# Patient Record
Sex: FEMALE | Race: WHITE | NOT HISPANIC OR LATINO | Employment: FULL TIME | URBAN - METROPOLITAN AREA
[De-identification: names, ages, dates, MRNs, and addresses within clinical notes are randomized per-mention and may not be internally consistent; named-entity substitution may affect disease eponyms.]

---

## 2024-06-12 ENCOUNTER — TELEPHONE (OUTPATIENT)
Dept: BARIATRICS/WEIGHT MGMT | Facility: CLINIC | Age: 39
End: 2024-06-12
Payer: COMMERCIAL

## 2024-06-12 NOTE — TELEPHONE ENCOUNTER
Called Viv Ladd    to discuss bariatric new patient packet   0 Months of SWL require by insurance       Current Health Plan(s)   Yes Cert of Coverage Obtained    VIA  OFFICE    Primary/ ID #  REJI KIRK  [id number]            Secondary/ ID #             Exclusion on Policy     Exclusion   No Pt wanting selfpay No     Deductible Individual                  Amount Met  1500.00                                        279.98 OOP Max Individual           Amount Met  3000.00                               307.54     Deductible Family                       Amount Met   OOP Max Family               Amount Met       Bariatric Lifetime Max NONE      Facility or Accreditation required?    Insurance Coverage % 80/20       Called Patient 6.12.24    Intake Scheduled for 7.16.24       Arrive at 745AM, bring drink, snack, and jacket, classroom is cold.  All pt will have class, [facility]      Cert of Coverage attached Yes         Told pt to expect call from [employer], area code [phone] about 2 weeks prior to intake.   Paperwork needs to be completed before intake appt, or may cause delay in eval. Yes

## 2024-07-16 ENCOUNTER — CONSULT (OUTPATIENT)
Dept: BARIATRICS/WEIGHT MGMT | Facility: CLINIC | Age: 39
End: 2024-07-16
Payer: COMMERCIAL

## 2024-07-16 VITALS
BODY MASS INDEX: 41.54 KG/M2 | RESPIRATION RATE: 18 BRPM | HEIGHT: 68 IN | HEART RATE: 101 BPM | DIASTOLIC BLOOD PRESSURE: 83 MMHG | SYSTOLIC BLOOD PRESSURE: 130 MMHG | OXYGEN SATURATION: 97 % | WEIGHT: 274.1 LBS

## 2024-07-16 DIAGNOSIS — Z01.818 PRE-OP EXAM: ICD-10-CM

## 2024-07-16 DIAGNOSIS — E66.01 OBESITY, CLASS III, BMI 40-49.9 (MORBID OBESITY): Primary | ICD-10-CM

## 2024-07-16 PROCEDURE — 99205 OFFICE O/P NEW HI 60 MIN: CPT | Performed by: NURSE PRACTITIONER

## 2024-07-16 RX ORDER — ESCITALOPRAM OXALATE 20 MG/1
1 TABLET ORAL DAILY
COMMUNITY
Start: 2024-05-05

## 2024-07-16 RX ORDER — SUMATRIPTAN 50 MG/1
50 TABLET, FILM COATED ORAL
COMMUNITY

## 2024-07-16 RX ORDER — DESOGESTREL AND ETHINYL ESTRADIOL 0.15-0.03
1 KIT ORAL DAILY
COMMUNITY
Start: 2024-07-01

## 2024-07-16 NOTE — PROGRESS NOTES
"Nutrition Services    Patient Name: Viv Ladd  YOB: 1985  MRN: [id number]  Date of Service: 07/16/24      ICD-10-CM ICD-9-CM   1. Obesity, Class III, BMI 40-49.9 (morbid obesity)  E66.01 278.01        RD Recommendation        Candidacy for surgery? Good   RD Comments Recommend patient for surgery   Procedure Patient is pursuing MWM vs surgery     NUTRITION ASSESSMENT - BARIATRIC SURGERY      Reason for Visit RDN eval for surgery, SWL 1/0     H&P      History reviewed. No pertinent past medical history.    Past Surgical History:   Procedure Laterality Date    BREAST AUGMENTATION          Previous Goals          Encounter Information        Visit Narrative     Patient considering MWM program over surgery.     Diet Recall:   Breakfast: skips, coffee  Lunch: sandwich with veggies and yogurt  Dinner: trying not to eat out currently so she has been cooking at home - frozen veggies   Snacks: chips, candy  Beverages: water, diet soda at night (2/day)    Exercise: no current exercise. 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       Nutrition Diagnosis         Nutrition Dx Statement Overweight/obesity RT multifactorial biochemical, behavioral and environmental contributors to disease AEB BMI 41.37 kg/m^2         Nutrition Intervention         Nutrition Intervention Nutrition education related to diet modification and physical activity        Monitor/Evaluation        New Goals Patient to add in MV  Patient to plan meals with protein, carb and color  Patient to add in 1-2 days of exercise for 10-15 minutes        Total time spent with pt 15 minutes of which 15 minutes were spent on education. "       Electronically signed by:  Edgardo Castillo RD  07/16/24 10:20 EDT
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
## 2024-08-01 ENCOUNTER — OFFICE VISIT (OUTPATIENT)
Dept: BARIATRICS/WEIGHT MGMT | Facility: CLINIC | Age: 39
End: 2024-08-01
Payer: COMMERCIAL

## 2024-08-01 VITALS
WEIGHT: 274.4 LBS | DIASTOLIC BLOOD PRESSURE: 80 MMHG | RESPIRATION RATE: 18 BRPM | BODY MASS INDEX: 41.59 KG/M2 | SYSTOLIC BLOOD PRESSURE: 121 MMHG | HEIGHT: 68 IN | HEART RATE: 87 BPM | OXYGEN SATURATION: 97 %

## 2024-08-01 DIAGNOSIS — E66.01 OBESITY, CLASS III, BMI 40-49.9 (MORBID OBESITY): Primary | ICD-10-CM

## 2024-08-01 NOTE — PROGRESS NOTES
[facility]  [address] [place] [address]  [address] [place] [address]  Dept: [phone]  8/1/2024      Viv Ladd.  [id number]  [id number]  1985  female      Chief Complaint of weight gain; unable to maintain weight loss    History of Present Illness:   Viv is a 39 y.o. female who presents today for evaluation, education and consultation regarding medical weight management.  She has a long history of being overweight. She lost weight in her early 20s. Over the last 10 years she started gaining about 10 lbs a year.     She does not exercise now.     Diet History: See dietician/RN/MA documentation for complete history of weight and diet.          Past weight loss attempts:       Past Medical History:   Diagnosis Date   • Anxiety    • Depression    • GERD (gastroesophageal reflux disease)    • Insomnia    • Plantar fasciitis    • SOB (shortness of breath) on exertion    • Urinary frequency        Past Surgical History:   Procedure Laterality Date   • BREAST AUGMENTATION     • BREAST SURGERY  December 2010    Breast lift       Allergies   Allergen Reactions   • Latex Hives         Current Outpatient Medications:   •  Apri 0.15-30 MG-MCG per tablet, Take 1 tablet by mouth Daily., Disp: , Rfl:   •  escitalopram (LEXAPRO) 20 MG tablet, Take 1 tablet by mouth Daily., Disp: , Rfl:   •  SUMAtriptan (IMITREX) 50 MG tablet, Take 1 tablet by mouth Every 2 (Two) Hours As Needed for Migraine. Take one tablet at onset of headache. May repeat dose one time in 2 hours if headache not relieved., Disp: , Rfl:     Social History     Socioeconomic History   • Marital status: Unknown   Tobacco Use   • Smoking status: Never   Substance and Sexual Activity   • Alcohol use: Not Currently   • Drug use: Never   • Sexual activity: Not Currently     Birth control/protection: Birth control pill       Family History   Problem Relation Age of  "Onset   • Cancer Mother         Skin cancer   • Hyperlipidemia Father    • Sleep apnea Father    • Alcohol abuse Father    • Hypertension Maternal Grandmother    • Diabetes Maternal Grandmother    • Stroke Maternal Grandmother    • Cancer Maternal Grandmother         Skin cancer   • COPD Maternal Grandfather         Passed 2018   • Heart disease Maternal Grandfather    • Stroke Maternal Grandfather    • Hypertension Paternal Grandfather    • Heart attack Paternal Grandfather    • Alcohol abuse Paternal Grandfather         Most of the Wilberto side are \"functioning\" alcoholics   • Alcohol abuse Sister    • Cancer Sister         Skin cancer         Review of Systems:  Review of Systems    Physical Exam:  Vital Signs:  Weight: 124 kg (274 lb 6.4 oz)   Body mass index is 267.21 kg/m².      Heart Rate: 87   BP: 121/80     Physical Exam       Assessment:         Viv Ladd is a 39 y.o. year old female with medically complicated severe obesity. Weight: 124 kg (274 lb 6.4 oz), Body mass index is 267.21 kg/m². and weight related problems.    CBC, CMP,  TSH, lipid panel, Vitamin D and HgbA1C will be drawn.       The patient was advised to start a high protein, low fat and low carbohydrate diet. The patient was given individualized information  along with general group information and handouts.     The patient was encouraged to start routine exercise including but not limited to 150 minutes per week.     The consultation plan was reviewed with the patient.        I think she is a good candidate for medical weight management. If appropriate, weight loss medications will be discussed at next visit.     No diagnosis found.    Plan:    Patient will have evaluations and follow up with bariatric dietician before undergoing a multidisciplinary review of their candidacy.  We also discussed other program requirements.      Total time spent with pt 60 minutes of which 45 minutes were spent on education.     Gabriela Rodarte MD  8/1/2024 "

## 2024-08-01 NOTE — PROGRESS NOTES
"Nutrition Services    Patient Name: Viv Ladd  YOB: 1985  MRN: [id number]  Date of Service: 08/01/24      ICD-10-CM ICD-9-CM   1. Obesity, Class III, BMI 40-49.9 (morbid obesity)  E66.01 278.01          NUTRITION ASSESSMENT - BARIATRIC SURGERY      Reason for Visit MWM new patient, appt 1     H&P      Past Medical History:   Diagnosis Date    Anxiety     Depression     GERD (gastroesophageal reflux disease)     Insomnia     Plantar fasciitis     SOB (shortness of breath) on exertion     Urinary frequency        Past Surgical History:   Procedure Laterality Date    BREAST AUGMENTATION      BREAST SURGERY  December 2010    Breast lift        Previous Goals   Patient to add in MV - met  Patient to plan meals with protein, carb and color - working on  Patient to add in 1-2 days of exercise for 10-15 minutes - working on       Encounter Information        Visit Narrative     Patient was originally pursuing surgery but now in MWM program. Patient has been working on diet and doing well overall. Patient going to plan meals for start of school year. Patient has not yet added in exercise but has been active with school back in session.     Diet Recall:   Breakfast: coffee  Lunch:  Dinner:  Snacks:  Beverages:    Exercise: active at work    Supplements: MV    Self Monitoring:          Anthropometrics        Current Height, Weight Height: 172.7 cm (68\")  Weight: 124 kg (274 lb 6.4 oz) (08/01/24 1347)            Wt Readings from Last 30 Encounters:   08/01/24 1347 124 kg (274 lb 6.4 oz)   07/16/24 0753 124 kg (274 lb 1.6 oz)      BMI kg/m2 Body mass index is 41.72 kg/m².       Nutrition Diagnosis         Nutrition Dx Statement Overweight/obesity RT multifactorial biochemical, behavioral and environmental contributors to disease AEB BMI 41.72 kg/m^2         Nutrition Intervention         Nutrition Intervention Nutrition education related to diet modification and physical activity        Monitor/Evaluation  "       New Goals Patient to continue planning meals with protein, carb and color  Patient to add in exercise as able, aiming for 1-2 days each week for 15-30 minutes        Total time spent with pt 15 minutes of which 15 minutes were spent on education.       Electronically signed by:  Edgardo Castillo RD  08/01/24 14:43 EDT

## 2024-08-12 ENCOUNTER — TELEPHONE (OUTPATIENT)
Dept: BARIATRICS/WEIGHT MGMT | Facility: CLINIC | Age: 39
End: 2024-08-12
Payer: COMMERCIAL

## 2024-08-12 NOTE — TELEPHONE ENCOUNTER
Patient called states denied for Ozempic by Insurance. Wants to know what the alternative is....  Please advise

## 2024-08-13 ENCOUNTER — TELEPHONE (OUTPATIENT)
Dept: BARIATRICS/WEIGHT MGMT | Facility: CLINIC | Age: 39
End: 2024-08-13
Payer: COMMERCIAL

## 2024-08-13 NOTE — TELEPHONE ENCOUNTER
CALLER: NATHALIA ESPARZA    RELATIONSHIP: SELF    MNT, NON-SURGICAL, OR SURGICAL PATIENT: NON SURGIGAL    Surgery patients only:  ARE YOU WORKING TOWARDS SURGERY or HAVE YOU ALREADY HAD SURGERY:   WHAT SURGERY DID YOU HAVE:   WHEN WAS YOUR SURGERY:        WHO ARE YOU REQUESTING TO SPEAK WITH:     WHAT WAS THE CALL REGARDING: THE MEDICATION THAT WAS CALLED IN YESTERDAY WAS DENIED BY INSURANCE. THE MEDICATION WAS OZEMPIC. PLEASE CALL IN ANOTHER MEDICATION IF POSSIBLE     DO YOU REQUIRE A CALLBACK or [employer]HART MESSAGE: PLEASE RESPOND IN [employer] MESSAGE     BEST CALL BACK NUMBER:      Inform caller you will send a message to staff member they requested to speak with and should expect a return call within 48 hours. Unless call is high priority or urgent.

## 2024-08-14 NOTE — TELEPHONE ENCOUNTER
[facility], but cost will be about 250-300 a month. Are they ok with that?   Gabriela Rodarte

## 2024-08-29 ENCOUNTER — OFFICE VISIT (OUTPATIENT)
Dept: BARIATRICS/WEIGHT MGMT | Facility: CLINIC | Age: 39
End: 2024-08-29
Payer: COMMERCIAL

## 2024-08-29 VITALS
HEART RATE: 85 BPM | BODY MASS INDEX: 41.59 KG/M2 | HEIGHT: 68 IN | DIASTOLIC BLOOD PRESSURE: 77 MMHG | WEIGHT: 274.4 LBS | OXYGEN SATURATION: 98 % | SYSTOLIC BLOOD PRESSURE: 124 MMHG

## 2024-08-29 DIAGNOSIS — Z79.899 MEDICATION MANAGEMENT: ICD-10-CM

## 2024-08-29 DIAGNOSIS — E66.01 OBESITY, CLASS III, BMI 40-49.9 (MORBID OBESITY): Primary | ICD-10-CM

## 2024-08-29 RX ORDER — ONDANSETRON 8 MG/1
8 TABLET, FILM COATED ORAL EVERY 8 HOURS PRN
Qty: 30 TABLET | Refills: 2 | Status: SHIPPED | OUTPATIENT
Start: 2024-08-29

## 2024-08-29 RX ORDER — SEMAGLUTIDE 0.5 MG/.5ML
0.5 INJECTION, SOLUTION SUBCUTANEOUS WEEKLY
Qty: 2 ML | Refills: 0 | Status: SHIPPED | OUTPATIENT
Start: 2024-08-29

## 2024-08-29 NOTE — PROGRESS NOTES
"Nutrition Services    Patient Name: Viv Ladd  YOB: 1985  MRN: [id number]  Date of Service: 08/29/24      ICD-10-CM ICD-9-CM   1. Obesity, Class III, BMI 40-49.9 (morbid obesity)  E66.01 278.01          NUTRITION ASSESSMENT - BARIATRIC SURGERY      Reason for Visit MWM appt 2, follow up     H&P      Past Medical History:   Diagnosis Date    Anxiety     Depression     GERD (gastroesophageal reflux disease)     Insomnia     Plantar fasciitis     SOB (shortness of breath) on exertion     Urinary frequency        Past Surgical History:   Procedure Laterality Date    BREAST AUGMENTATION      BREAST SURGERY  December 2010    Breast lift        Previous Goals   Patient to continue planning meals with protein, carb and color  Patient to add in exercise as able, aiming for 1-2 days each week for 15-30 minutes       Encounter Information        Visit Narrative     Patient states she had been doing well with meal planning but then started eating out more often. 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 Patient reports she has been busier on weekends but has not added in exercise.     Diet Recall:   Breakfast: coffee, protein shake  Lunch: sandwich with pepper or carrots or fruit  Dinner: take out or chicken nuggets with veggies  Snacks: nuts, candy  Beverages: 80 oz of water    Exercise: patient has been more active on weekends    Supplements:    Self Monitoring:          Anthropometrics        Current Height, Weight Height: 173.4 cm (68.25\")  Weight: 124 kg (274 lb 6.4 oz) (08/29/24 0921)            Wt Readings from Last 30 Encounters:   08/29/24 0921 124 kg (274 lb 6.4 oz)   08/01/24 1347 124 kg (274 lb 6.4 oz)   07/16/24 0753 124 kg (274 lb 1.6 oz)      BMI kg/m2 Body mass index is 41.42 kg/m².       Nutrition Diagnosis         Nutrition Dx Statement Overweight/obesity RT multifactorial biochemical, behavioral and environmental contributors to disease " AEB BMI 41.42 kg/m^2         Nutrition Intervention         Nutrition Intervention Nutrition education related to diet modification and physical activity        Monitor/Evaluation        New Goals Patient to continue meal planning with protein, color and carbs  Patient to add in exercise on weekends for 15-30 minutes        Total time spent with pt 15 minutes of which 15 minutes were spent on education.       Electronically signed by:  Edgardo Castillo RD  08/29/24 09:47 EDT

## 2024-08-29 NOTE — PROGRESS NOTES
[facility] BAR SURG [facility] BARIATRIC SURGERY  [address] [place] [address]  [address] [place] [address]  Dept: [phone]  8/29/2024      Viv Ladd.  [id number]  [id number]  1985  female      Chief Complaint   Patient presents with    Follow-up     MWM #2       The patient is here for month 3of medical weight management. She had a loss of 0 lbs. The patient states that they are following the recommendations given by our office and dietician including a high lean protein, low carb and low fat diet. We recommended adequate fruits and vegetable intake along with limited portion sizes. Patient is working on eliminating fast foods, fried foods, sweets and soda. Viv Ladd has been increasing her daily water intake. She has been exercising: walking some at work.  Wt Readings from Last 10 Encounters:   08/29/24 124 kg (274 lb 6.4 oz)   08/01/24 124 kg (274 lb 6.4 oz)   07/16/24 124 kg (274 lb 1.6 oz)     Patient states they have made positive changes including none  The patient admits to be struggling with find a weight loss medication covered with insurance       Breakfast: coffee  Lunch: turkey sandwich, carrots or peppers, fruit   Dinner: take out or chicken nuggets and a veggie  Snacks: nuts, candy  Drinks: water  Exercise: minimal, walking some at work     Review of Systems   Constitutional:  Positive for fatigue.   Respiratory: Negative.     Cardiovascular: Negative.    Gastrointestinal: Negative.    Musculoskeletal: Negative.      Vitals:    08/29/24 0921   BP: 124/77   Pulse: 85   SpO2: 98%         Body mass index is 41.42 kg/m².    The following portions of the patient's history were reviewed and updated as appropriate: active problem list, medication list, allergies, social history, notes from last encounter  Past Medical History:   Diagnosis Date    Anxiety     Depression     GERD (gastroesophageal reflux disease)     Insomnia      Plantar fasciitis     SOB (shortness of breath) on exertion     Urinary frequency      Past Surgical History:   Procedure Laterality Date    BREAST AUGMENTATION      BREAST SURGERY  December 2010    Breast lift        Physical Exam  Constitutional:       Appearance: Normal appearance. She is obese.   Pulmonary:      Effort: Pulmonary effort is normal.   Abdominal:      General: Abdomen is flat.   Skin:     General: Skin is warm and dry.   Neurological:      General: No focal deficit present.      Mental Status: She is alert and oriented to person, place, and time.   Psychiatric:         Mood and Affect: Mood normal.         Behavior: Behavior normal.         Thought Content: Thought content normal.         Judgment: Judgment normal.         Discussion/Plan:  BMI 41.42, Class 2 obesity, medication management: wegovy    Obesity/Morbid Obesity: Currently the patient's weight is stable. Treatment plan includes prescribed diet, prescribed exercise regimen and behavior modification.     Pt was prescribed ozempic at last visit but this was denied due to lack of DMII history. I did speak with the pt about wegovy as it appears she may have weight loss medication coverage. I did give the pt a sample box of wegovy 0.25 mg weekly to start and sent in the next rx for 0.5 mg weekly wegovy. Plan to follow up in 1 month for med check.  I did also speak with the pt about compounded semaglutide if needed as well.       Pt  denies any hx of multiple endocrine neoplasia type 2 or medullary thyroid cancer for herself or her immediate family with GLP-1's. Pt encouraged to call the office with any nausea/ vomiting or abdominal pain with GLP-1's. Pt also informed constipation can happen with these medications due to slowing of gut. Pt encouraged to take a stool softener/ laxative if constipation occurs.     Will prescribe wegovy for pt today.     I reviewed the appropriate dietary choices with the patient and encouraged the necessary  changes. Recommended at least 70 grams of protein per day, around 35 grams of fats and less than 100 grams of carbohydrates. Reviewed calorie intake if patient wanted to calorie count and/or had BMR. Instructed patient to drink half of body weight in ounces per day and exercise a minimum of 150 minutes per week including both cardio and strength training. Discussed the option of keeping a food journal which will help patient become more aware of the nutritional value of foods .     The patient was given written materials from our office for diet plans and medications.   I answered all of the patients questions regarding dietary changes, exercise, and medications.   The patient will follow up in 1 month. The total time spent face to face was 30 minutes with 15 minutes spent counseling.    Encounter Diagnosis   Name Primary?    Obesity, Class III, BMI 40-49.9 (morbid obesity) Yes       LAILA Barber  [facility]ariat[facility]

## 2024-09-03 ENCOUNTER — OFFICE (AMBULATORY)
Dept: URBAN - METROPOLITAN AREA CLINIC 64 | Facility: CLINIC | Age: 39
End: 2024-09-03

## 2024-09-03 ENCOUNTER — OFFICE (AMBULATORY)
Age: 39
End: 2024-09-03

## 2024-09-03 VITALS
HEART RATE: 85 BPM | DIASTOLIC BLOOD PRESSURE: 87 MMHG | HEIGHT: 68 IN | SYSTOLIC BLOOD PRESSURE: 124 MMHG | HEART RATE: 85 BPM | HEART RATE: 85 BPM | WEIGHT: 277 LBS | DIASTOLIC BLOOD PRESSURE: 87 MMHG | HEIGHT: 68 IN | SYSTOLIC BLOOD PRESSURE: 124 MMHG | SYSTOLIC BLOOD PRESSURE: 124 MMHG | HEART RATE: 85 BPM | DIASTOLIC BLOOD PRESSURE: 87 MMHG | DIASTOLIC BLOOD PRESSURE: 87 MMHG | DIASTOLIC BLOOD PRESSURE: 87 MMHG | SYSTOLIC BLOOD PRESSURE: 124 MMHG | WEIGHT: 277 LBS | DIASTOLIC BLOOD PRESSURE: 87 MMHG | HEIGHT: 68 IN | WEIGHT: 277 LBS | HEART RATE: 85 BPM | HEIGHT: 68 IN | HEIGHT: 68 IN | HEIGHT: 68 IN | SYSTOLIC BLOOD PRESSURE: 124 MMHG | WEIGHT: 277 LBS | DIASTOLIC BLOOD PRESSURE: 87 MMHG | SYSTOLIC BLOOD PRESSURE: 124 MMHG | HEART RATE: 85 BPM | SYSTOLIC BLOOD PRESSURE: 124 MMHG | HEIGHT: 68 IN | WEIGHT: 277 LBS | WEIGHT: 277 LBS | WEIGHT: 277 LBS | HEART RATE: 85 BPM

## 2024-09-03 DIAGNOSIS — K52.9 NONINFECTIVE GASTROENTERITIS AND COLITIS, UNSPECIFIED: ICD-10-CM

## 2024-09-03 DIAGNOSIS — K30 FUNCTIONAL DYSPEPSIA: ICD-10-CM

## 2024-09-03 PROCEDURE — 99203 OFFICE O/P NEW LOW 30 MIN: CPT | Performed by: INTERNAL MEDICINE

## 2024-09-03 RX ORDER — DICYCLOMINE HYDROCHLORIDE 10 MG/1
CAPSULE ORAL
Qty: 60 | Refills: 3 | Status: ACTIVE
Start: 2024-09-03

## 2024-09-03 RX ORDER — OMEPRAZOLE 20 MG/1
20 CAPSULE, DELAYED RELEASE ORAL
Qty: 30 | Refills: 11 | Status: ACTIVE
Start: 2024-09-03

## 2024-09-12 ENCOUNTER — TELEPHONE (OUTPATIENT)
Dept: BARIATRICS/WEIGHT MGMT | Facility: CLINIC | Age: 39
End: 2024-09-12
Payer: COMMERCIAL

## 2024-09-12 NOTE — TELEPHONE ENCOUNTER
PA request for Wegovy 0.5mg  has been completed through Cover My Meds with [facility] franco# [id number]    Waiting for response

## 2024-09-19 RX ORDER — SEMAGLUTIDE 0.5 MG/.5ML
0.5 INJECTION, SOLUTION SUBCUTANEOUS WEEKLY
Qty: 2 ML | Refills: 0 | Status: SHIPPED | OUTPATIENT
Start: 2024-09-19

## 2024-09-26 ENCOUNTER — TELEMEDICINE (OUTPATIENT)
Dept: BARIATRICS/WEIGHT MGMT | Facility: CLINIC | Age: 39
End: 2024-09-26
Payer: COMMERCIAL

## 2024-09-26 VITALS — HEIGHT: 68 IN | WEIGHT: 270 LBS | BODY MASS INDEX: 40.92 KG/M2

## 2024-09-26 DIAGNOSIS — Z79.899 MEDICATION MANAGEMENT: ICD-10-CM

## 2024-09-26 DIAGNOSIS — E66.01 OBESITY, CLASS III, BMI 40-49.9 (MORBID OBESITY): Primary | ICD-10-CM

## 2024-09-26 RX ORDER — SEMAGLUTIDE 0.5 MG/.5ML
0.5 INJECTION, SOLUTION SUBCUTANEOUS WEEKLY
Qty: 2 ML | Refills: 0 | Status: SHIPPED | OUTPATIENT
Start: 2024-09-26 | End: 2024-09-27 | Stop reason: SDUPTHER

## 2024-09-27 RX ORDER — SEMAGLUTIDE 0.5 MG/.5ML
0.5 INJECTION, SOLUTION SUBCUTANEOUS WEEKLY
Qty: 2 ML | Refills: 0 | Status: SHIPPED | OUTPATIENT
Start: 2024-09-27

## 2024-10-02 ENCOUNTER — OFFICE (AMBULATORY)
Age: 39
End: 2024-10-02

## 2024-10-02 ENCOUNTER — ON CAMPUS - OUTPATIENT (AMBULATORY)
Age: 39
End: 2024-10-02

## 2024-10-02 ENCOUNTER — OFFICE (AMBULATORY)
Dept: URBAN - METROPOLITAN AREA PATHOLOGY 19 | Facility: PATHOLOGY | Age: 39
End: 2024-10-02

## 2024-10-02 ENCOUNTER — ON CAMPUS - OUTPATIENT (AMBULATORY)
Dept: URBAN - METROPOLITAN AREA HOSPITAL 2 | Facility: HOSPITAL | Age: 39
End: 2024-10-02

## 2024-10-02 VITALS
HEART RATE: 86 BPM | DIASTOLIC BLOOD PRESSURE: 82 MMHG | SYSTOLIC BLOOD PRESSURE: 146 MMHG | WEIGHT: 264 LBS | SYSTOLIC BLOOD PRESSURE: 115 MMHG | WEIGHT: 264 LBS | OXYGEN SATURATION: 100 % | RESPIRATION RATE: 18 BRPM | SYSTOLIC BLOOD PRESSURE: 107 MMHG | SYSTOLIC BLOOD PRESSURE: 107 MMHG | DIASTOLIC BLOOD PRESSURE: 76 MMHG | HEART RATE: 104 BPM | DIASTOLIC BLOOD PRESSURE: 65 MMHG | OXYGEN SATURATION: 97 % | SYSTOLIC BLOOD PRESSURE: 115 MMHG | HEART RATE: 95 BPM | HEART RATE: 91 BPM | RESPIRATION RATE: 18 BRPM | DIASTOLIC BLOOD PRESSURE: 85 MMHG | RESPIRATION RATE: 18 BRPM | SYSTOLIC BLOOD PRESSURE: 145 MMHG | HEART RATE: 90 BPM | DIASTOLIC BLOOD PRESSURE: 82 MMHG | HEART RATE: 90 BPM | WEIGHT: 264 LBS | OXYGEN SATURATION: 97 % | HEIGHT: 68 IN | SYSTOLIC BLOOD PRESSURE: 122 MMHG | SYSTOLIC BLOOD PRESSURE: 110 MMHG | HEART RATE: 95 BPM | OXYGEN SATURATION: 100 % | HEART RATE: 95 BPM | HEART RATE: 91 BPM | HEART RATE: 104 BPM | SYSTOLIC BLOOD PRESSURE: 119 MMHG | SYSTOLIC BLOOD PRESSURE: 115 MMHG | DIASTOLIC BLOOD PRESSURE: 76 MMHG | SYSTOLIC BLOOD PRESSURE: 107 MMHG | DIASTOLIC BLOOD PRESSURE: 97 MMHG | OXYGEN SATURATION: 98 % | DIASTOLIC BLOOD PRESSURE: 94 MMHG | DIASTOLIC BLOOD PRESSURE: 67 MMHG | DIASTOLIC BLOOD PRESSURE: 76 MMHG | HEIGHT: 68 IN | HEIGHT: 68 IN | HEART RATE: 86 BPM | DIASTOLIC BLOOD PRESSURE: 76 MMHG | TEMPERATURE: 96.9 F | DIASTOLIC BLOOD PRESSURE: 65 MMHG | DIASTOLIC BLOOD PRESSURE: 82 MMHG | HEART RATE: 90 BPM | DIASTOLIC BLOOD PRESSURE: 76 MMHG | HEART RATE: 86 BPM | DIASTOLIC BLOOD PRESSURE: 76 MMHG | SYSTOLIC BLOOD PRESSURE: 122 MMHG | HEART RATE: 95 BPM | RESPIRATION RATE: 18 BRPM | OXYGEN SATURATION: 100 % | DIASTOLIC BLOOD PRESSURE: 67 MMHG | SYSTOLIC BLOOD PRESSURE: 119 MMHG | TEMPERATURE: 96.9 F | SYSTOLIC BLOOD PRESSURE: 107 MMHG | SYSTOLIC BLOOD PRESSURE: 119 MMHG | HEART RATE: 86 BPM | DIASTOLIC BLOOD PRESSURE: 85 MMHG | OXYGEN SATURATION: 100 % | SYSTOLIC BLOOD PRESSURE: 110 MMHG | DIASTOLIC BLOOD PRESSURE: 65 MMHG | SYSTOLIC BLOOD PRESSURE: 145 MMHG | HEART RATE: 95 BPM | DIASTOLIC BLOOD PRESSURE: 94 MMHG | HEART RATE: 90 BPM | HEART RATE: 104 BPM | WEIGHT: 264 LBS | TEMPERATURE: 96.9 F | DIASTOLIC BLOOD PRESSURE: 65 MMHG | OXYGEN SATURATION: 98 % | RESPIRATION RATE: 16 BRPM | TEMPERATURE: 96.9 F | HEART RATE: 86 BPM | OXYGEN SATURATION: 98 % | SYSTOLIC BLOOD PRESSURE: 146 MMHG | RESPIRATION RATE: 16 BRPM | HEART RATE: 104 BPM | HEART RATE: 91 BPM | SYSTOLIC BLOOD PRESSURE: 122 MMHG | DIASTOLIC BLOOD PRESSURE: 82 MMHG | SYSTOLIC BLOOD PRESSURE: 107 MMHG | HEART RATE: 90 BPM | SYSTOLIC BLOOD PRESSURE: 145 MMHG | DIASTOLIC BLOOD PRESSURE: 85 MMHG | SYSTOLIC BLOOD PRESSURE: 122 MMHG | DIASTOLIC BLOOD PRESSURE: 85 MMHG | SYSTOLIC BLOOD PRESSURE: 145 MMHG | SYSTOLIC BLOOD PRESSURE: 110 MMHG | TEMPERATURE: 96.9 F | SYSTOLIC BLOOD PRESSURE: 115 MMHG | SYSTOLIC BLOOD PRESSURE: 115 MMHG | SYSTOLIC BLOOD PRESSURE: 115 MMHG | RESPIRATION RATE: 16 BRPM | HEART RATE: 91 BPM | WEIGHT: 264 LBS | RESPIRATION RATE: 16 BRPM | HEIGHT: 68 IN | SYSTOLIC BLOOD PRESSURE: 119 MMHG | WEIGHT: 264 LBS | DIASTOLIC BLOOD PRESSURE: 94 MMHG | DIASTOLIC BLOOD PRESSURE: 97 MMHG | RESPIRATION RATE: 16 BRPM | DIASTOLIC BLOOD PRESSURE: 67 MMHG | SYSTOLIC BLOOD PRESSURE: 146 MMHG | OXYGEN SATURATION: 100 % | OXYGEN SATURATION: 97 % | DIASTOLIC BLOOD PRESSURE: 94 MMHG | SYSTOLIC BLOOD PRESSURE: 146 MMHG | HEART RATE: 91 BPM | SYSTOLIC BLOOD PRESSURE: 119 MMHG | HEART RATE: 104 BPM | SYSTOLIC BLOOD PRESSURE: 119 MMHG | RESPIRATION RATE: 16 BRPM | DIASTOLIC BLOOD PRESSURE: 97 MMHG | SYSTOLIC BLOOD PRESSURE: 145 MMHG | HEART RATE: 95 BPM | RESPIRATION RATE: 16 BRPM | DIASTOLIC BLOOD PRESSURE: 85 MMHG | OXYGEN SATURATION: 97 % | DIASTOLIC BLOOD PRESSURE: 97 MMHG | HEART RATE: 95 BPM | SYSTOLIC BLOOD PRESSURE: 146 MMHG | HEART RATE: 104 BPM | OXYGEN SATURATION: 98 % | OXYGEN SATURATION: 98 % | OXYGEN SATURATION: 100 % | DIASTOLIC BLOOD PRESSURE: 67 MMHG | SYSTOLIC BLOOD PRESSURE: 122 MMHG | SYSTOLIC BLOOD PRESSURE: 107 MMHG | DIASTOLIC BLOOD PRESSURE: 76 MMHG | DIASTOLIC BLOOD PRESSURE: 94 MMHG | TEMPERATURE: 96.9 F | SYSTOLIC BLOOD PRESSURE: 145 MMHG | HEART RATE: 91 BPM | RESPIRATION RATE: 18 BRPM | DIASTOLIC BLOOD PRESSURE: 94 MMHG | OXYGEN SATURATION: 98 % | HEART RATE: 104 BPM | DIASTOLIC BLOOD PRESSURE: 82 MMHG | DIASTOLIC BLOOD PRESSURE: 67 MMHG | HEIGHT: 68 IN | DIASTOLIC BLOOD PRESSURE: 82 MMHG | SYSTOLIC BLOOD PRESSURE: 110 MMHG | OXYGEN SATURATION: 98 % | HEART RATE: 86 BPM | DIASTOLIC BLOOD PRESSURE: 97 MMHG | WEIGHT: 264 LBS | DIASTOLIC BLOOD PRESSURE: 94 MMHG | OXYGEN SATURATION: 97 % | TEMPERATURE: 96.9 F | SYSTOLIC BLOOD PRESSURE: 145 MMHG | SYSTOLIC BLOOD PRESSURE: 110 MMHG | HEART RATE: 90 BPM | SYSTOLIC BLOOD PRESSURE: 122 MMHG | SYSTOLIC BLOOD PRESSURE: 146 MMHG | SYSTOLIC BLOOD PRESSURE: 122 MMHG | HEIGHT: 68 IN | SYSTOLIC BLOOD PRESSURE: 119 MMHG | RESPIRATION RATE: 18 BRPM | OXYGEN SATURATION: 100 % | SYSTOLIC BLOOD PRESSURE: 115 MMHG | DIASTOLIC BLOOD PRESSURE: 82 MMHG | DIASTOLIC BLOOD PRESSURE: 85 MMHG | HEART RATE: 86 BPM | DIASTOLIC BLOOD PRESSURE: 97 MMHG | OXYGEN SATURATION: 97 % | DIASTOLIC BLOOD PRESSURE: 67 MMHG | SYSTOLIC BLOOD PRESSURE: 107 MMHG | SYSTOLIC BLOOD PRESSURE: 146 MMHG | SYSTOLIC BLOOD PRESSURE: 110 MMHG | HEIGHT: 68 IN | SYSTOLIC BLOOD PRESSURE: 110 MMHG | DIASTOLIC BLOOD PRESSURE: 65 MMHG | OXYGEN SATURATION: 97 % | DIASTOLIC BLOOD PRESSURE: 67 MMHG | DIASTOLIC BLOOD PRESSURE: 65 MMHG | HEART RATE: 91 BPM | DIASTOLIC BLOOD PRESSURE: 65 MMHG | DIASTOLIC BLOOD PRESSURE: 97 MMHG | DIASTOLIC BLOOD PRESSURE: 85 MMHG | RESPIRATION RATE: 18 BRPM | HEART RATE: 90 BPM

## 2024-10-02 DIAGNOSIS — K64.0 FIRST DEGREE HEMORRHOIDS: ICD-10-CM

## 2024-10-02 DIAGNOSIS — R19.7 DIARRHEA, UNSPECIFIED: ICD-10-CM

## 2024-10-02 DIAGNOSIS — K59.1 FUNCTIONAL DIARRHEA: ICD-10-CM

## 2024-10-02 LAB
GI HISTOLOGY: A. LEFT COLON: (no result)
GI HISTOLOGY: B. RIGHT COLON: (no result)
GI HISTOLOGY: PDF REPORT: (no result)

## 2024-10-02 PROCEDURE — 88305 TISSUE EXAM BY PATHOLOGIST: CPT | Performed by: PATHOLOGY

## 2024-10-02 PROCEDURE — 45380 COLONOSCOPY AND BIOPSY: CPT | Performed by: INTERNAL MEDICINE

## 2024-10-02 RX ORDER — RIFAXIMIN 550 MG/1
1650 TABLET ORAL
Qty: 42 | Refills: 2 | Status: ACTIVE
Start: 2024-10-02

## 2024-10-23 RX ORDER — SEMAGLUTIDE 0.5 MG/.5ML
0.5 INJECTION, SOLUTION SUBCUTANEOUS WEEKLY
Qty: 2 ML | Refills: 0 | Status: CANCELLED | OUTPATIENT
Start: 2024-10-23

## 2024-10-23 RX ORDER — SEMAGLUTIDE 1 MG/.5ML
1 INJECTION, SOLUTION SUBCUTANEOUS WEEKLY
Qty: 2 ML | Refills: 0 | Status: SHIPPED | OUTPATIENT
Start: 2024-10-23

## 2024-10-23 NOTE — TELEPHONE ENCOUNTER
Wegovy currently taking 0.5. I would like to increase to 0.75. The prescription can be sent to [facility] on [address] [facility] next to the hospital. My cell phone number is [phone]. I have one more shot that will be taken on Saturday. My current weight is 264.8 pounds. I am not having any side effects. I am not pregnant. I am currently taking the 0.5 and on my third shot this month. I took my third shot this past Saturday. I thought I had an appointment scheduled but it looks like I need to schedule one.

## 2024-11-20 RX ORDER — SEMAGLUTIDE 1.7 MG/.75ML
1.7 INJECTION, SOLUTION SUBCUTANEOUS WEEKLY
Qty: 3 ML | Refills: 2 | Status: SHIPPED | OUTPATIENT
Start: 2024-11-20

## 2024-11-20 NOTE — TELEPHONE ENCOUNTER
Wegovy is being requested. I have no side effects. I am currently on the 1.0 dosage. [facility] is my pharmacy. My cell phone number is [phone]. I am female with a current weight of 262. I am not pregnant and I am on my third week. I have one more week left with the dosage taken on Saturday. I would like to increase my dosage and I do not currently have an appointment. I communicate through this messaging.        TRISTEN Butcherr33 minutes ago (7:47 AM)     LW  Please answer all questions, Thank You!      Medication requested (name):      Current medication dose:      Pharmacy where request should be sent:      Additional details provided by patient:      Best call back number:      Does the patient have less than a 3 day supply:  [] Yes  [] No     For weight loss medication refill request please ask the patient the following questions as well:     What is your current weight:     Are you experiencing any abdominal pain, nausea, or vomiting:     Female patients: Are you currently pregnant or any chance of pregnancy:     How long have you been on your current dose:     Do you want to increase your dose or stay the same:     When did you take the medication last:     How many doses do you have remaining:      Next appointment date:      Alnie Miller MA  11/20/24, 07:47 EST

## 2024-12-23 RX ORDER — SEMAGLUTIDE 1.7 MG/.75ML
1.7 INJECTION, SOLUTION SUBCUTANEOUS WEEKLY
Qty: 3 ML | Refills: 2 | Status: CANCELLED | OUTPATIENT
Start: 2024-12-23

## 2024-12-23 RX ORDER — SEMAGLUTIDE 2.4 MG/.75ML
2.4 INJECTION, SOLUTION SUBCUTANEOUS WEEKLY
Qty: 3 ML | Refills: 2 | Status: SHIPPED | OUTPATIENT
Start: 2024-12-23

## 2025-01-30 ENCOUNTER — OFFICE VISIT (OUTPATIENT)
Dept: BARIATRICS/WEIGHT MGMT | Facility: CLINIC | Age: 40
End: 2025-01-30
Payer: COMMERCIAL

## 2025-01-30 VITALS
SYSTOLIC BLOOD PRESSURE: 135 MMHG | RESPIRATION RATE: 18 BRPM | DIASTOLIC BLOOD PRESSURE: 91 MMHG | HEIGHT: 68 IN | WEIGHT: 250.9 LBS | OXYGEN SATURATION: 97 % | HEART RATE: 90 BPM | BODY MASS INDEX: 38.03 KG/M2

## 2025-01-30 DIAGNOSIS — E66.812 OBESITY, CLASS II, BMI 35-39.9: Primary | ICD-10-CM

## 2025-01-30 DIAGNOSIS — Z79.899 MEDICATION MANAGEMENT: ICD-10-CM

## 2025-01-30 PROCEDURE — 99213 OFFICE O/P EST LOW 20 MIN: CPT | Performed by: NURSE PRACTITIONER

## 2025-01-30 RX ORDER — SEMAGLUTIDE 2.4 MG/.75ML
2.4 INJECTION, SOLUTION SUBCUTANEOUS WEEKLY
Qty: 9 ML | Refills: 1 | Status: SHIPPED | OUTPATIENT
Start: 2025-01-30

## 2025-01-30 NOTE — PROGRESS NOTES
[facility] SUR[facility] BARIATRIC SURGERY   [address] [place] [address]   [address] [place] [address]  Dept: [phone]  2025      Viv Ladd.  [id number]  [id number]  1985  female            Viv Ladd is a 39 y.o. female with morbid obesity with co-morbidities including:  none        The patient is here for follow up wegovy.  The patient states that they are following the recommendations given by our office and dietician including a high lean protein, low carb and low fat diet. We recommended adequate fruits and vegetable intake along with limited portion sizes. Patient is working on eliminating fast foods, fried foods, sweets and soda. Viv Ladd has been increasing her daily water intake. She has been [id number] steps a day, [employer] bike.  Wt Readings from Last 10 Encounters:   25 114 kg (250 lb 14.4 oz)   24 122 kg (270 lb)   24 124 kg (274 lb 6.4 oz)   24 124 kg (274 lb 6.4 oz)   24 124 kg (274 lb 1.6 oz)     Patient states they have made positive changes including increased protein, meeting with [facility] dietician   The patient admits to be struggling with getting wegovy reapproved through insurance     Minimal nausea, no vomiting , no abd pain or constipation   2.4 mg dosage brand wegovy     [facility] dietician   Breakfast: none usually , egg mcmuffin , protein waffles with peanut butter   Lunch: left overs from night before, salad with ham and turkey   Dinner: chicken soup , sandwich , chicken nuggets and veggies , chili   Snacks: in evening, nuts, veggies and dip  Drinks: water, soda prn at night   Exercise: [id number] steps a day, [employer] bike       Review of Systems   Constitutional:  Positive for activity change and appetite change.   Respiratory: Negative.     Cardiovascular: Negative.    Gastrointestinal:  Positive for nausea.   Musculoskeletal: Negative.      Vitals:    25  0811   BP: 135/91   Pulse: 90   Resp: 18   SpO2: 97%       Body mass index is 37.87 kg/m².    The following portions of the patient's history were reviewed and updated as appropriate: active problem list, medication list, allergies, social history, notes from last encounter  Past Medical History:   Diagnosis Date    Anxiety     Depression     GERD (gastroesophageal reflux disease)     Insomnia     Plantar fasciitis     SOB (shortness of breath) on exertion     Urinary frequency      Past Surgical History:   Procedure Laterality Date    BREAST AUGMENTATION      BREAST SURGERY  December 2010    Breast lift        Physical Exam  Constitutional:       Appearance: Normal appearance. She is obese.   Pulmonary:      Effort: Pulmonary effort is normal.   Abdominal:      General: Abdomen is flat.   Neurological:      General: No focal deficit present.      Mental Status: She is alert and oriented to person, place, and time.   Psychiatric:         Mood and Affect: Mood normal.         Behavior: Behavior normal.         Thought Content: Thought content normal.         Judgment: Judgment normal.         Discussion/Plan:  BMI 37.87, Class 2 obesity, medication management: wegovy    Obesity/Morbid Obesity: Currently the patient's weight is decreased. Treatment plan includes prescribed diet, prescribed exercise regimen and behavior modification.     Medication options for weight loss were discussed with the pt and based upon the patient's history and insurance , wegovy 2.4 mg weekly will be prescribed/ continued. Pt has completed  several months of this medication and is on 2.4 mg dosage. She is not having any vomiting, abd pain , constipation, minimal nausea day of injection when building up doses but none recently. Will refill rx for 2.4 mg dosage as this will be her top/ maintenance dose. Plan to follow up in 6 months for med check. She is needing a new PA showing > 5 % weight loss since starting the medication. Pt has lost  well over 5% of her body weight since starting the medication.       Pt  denies any hx of multiple endocrine neoplasia type 2 or medullary thyroid cancer for herself or her immediate family with GLP-1's. Pt encouraged to call the office with any nausea/ vomiting or abdominal pain with GLP-1's. Pt also informed constipation can happen with these medications due to slowing of gut. Pt encouraged to take a stool softener/ laxative if constipation occurs.     Will refill brand wegovy 2.4 mg weekly for pt today.     I reviewed the appropriate dietary choices with the patient and encouraged the necessary changes. Recommended at least 70 grams of protein per day, around 35 grams of fats and less than 100 grams of carbohydrates. Reviewed calorie intake if patient wanted to calorie count and/or had BMR. Instructed patient to drink half of body weight in ounces per day and exercise a minimum of 150 minutes per week including both cardio and strength training. Discussed the option of keeping a food journal which will help patient become more aware of the nutritional value of foods .     The patient was given written materials from our office for diet plans and medications.   I answered all of the patients questions regarding dietary changes, exercise, and medications.   The patient will follow up in 1 month. The total time spent face to face was 20 minutes with 15 minutes spent counseling.      LAILA Barber  [facility]ariat[facility]

## 2025-07-15 ENCOUNTER — TELEPHONE (OUTPATIENT)
Dept: BARIATRICS/WEIGHT MGMT | Facility: CLINIC | Age: 40
End: 2025-07-15
Payer: COMMERCIAL

## 2025-08-04 RX ORDER — SEMAGLUTIDE 2.4 MG/.75ML
2.4 INJECTION, SOLUTION SUBCUTANEOUS WEEKLY
Qty: 9 ML | Refills: 2 | Status: SHIPPED | OUTPATIENT
Start: 2025-08-04